# Patient Record
Sex: FEMALE | Race: WHITE | URBAN - METROPOLITAN AREA
[De-identification: names, ages, dates, MRNs, and addresses within clinical notes are randomized per-mention and may not be internally consistent; named-entity substitution may affect disease eponyms.]

---

## 2021-07-08 ENCOUNTER — OFFICE VISIT (OUTPATIENT)
Dept: URBAN - METROPOLITAN AREA CLINIC 13 | Facility: CLINIC | Age: 66
End: 2021-07-08
Payer: COMMERCIAL

## 2021-07-08 DIAGNOSIS — H26.9 CATARACT: ICD-10-CM

## 2021-07-08 DIAGNOSIS — H04.123 DRY EYE SYNDROME OF BILATERAL LACRIMAL GLANDS: ICD-10-CM

## 2021-07-08 DIAGNOSIS — H35.371 PUCKERING OF MACULA, RIGHT EYE: Primary | ICD-10-CM

## 2021-07-08 DIAGNOSIS — H35.349 MACULAR HOLE: ICD-10-CM

## 2021-07-08 PROCEDURE — 99204 OFFICE O/P NEW MOD 45 MIN: CPT | Performed by: OPHTHALMOLOGY

## 2021-07-08 PROCEDURE — 92134 CPTRZ OPH DX IMG PST SGM RTA: CPT | Performed by: OPHTHALMOLOGY

## 2021-07-08 ASSESSMENT — INTRAOCULAR PRESSURE
OD: 13
OS: 12

## 2021-07-08 NOTE — IMPRESSION/PLAN
Impression: h/o Lamellar Hole with VMT OS
s/p 23 g PPV / MP ILM / ICG / PRP  12/15/14 ([address] [place] [address]) Plan: Follow

## 2021-07-08 NOTE — IMPRESSION/PLAN
Impression: ERM, OD. Plan: The patient will be moving to the [facility]. Exam and OCT mild ERM OD (303 microns). Observe. Thanks, [employer] Return in 6 months, OCT OU, OCT OU, IVFA OD 1st, and in 1  year if stable